# Patient Record
Sex: MALE | Race: WHITE | NOT HISPANIC OR LATINO | Employment: OTHER | URBAN - METROPOLITAN AREA
[De-identification: names, ages, dates, MRNs, and addresses within clinical notes are randomized per-mention and may not be internally consistent; named-entity substitution may affect disease eponyms.]

---

## 2018-05-03 ENCOUNTER — OFFICE VISIT (OUTPATIENT)
Dept: FAMILY MEDICINE CLINIC | Facility: CLINIC | Age: 78
End: 2018-05-03
Payer: COMMERCIAL

## 2018-05-03 VITALS
HEIGHT: 75 IN | RESPIRATION RATE: 16 BRPM | TEMPERATURE: 97.2 F | OXYGEN SATURATION: 98 % | DIASTOLIC BLOOD PRESSURE: 90 MMHG | BODY MASS INDEX: 23.75 KG/M2 | WEIGHT: 191 LBS | HEART RATE: 83 BPM | SYSTOLIC BLOOD PRESSURE: 150 MMHG

## 2018-05-03 DIAGNOSIS — Z13.220 LIPID SCREENING: ICD-10-CM

## 2018-05-03 DIAGNOSIS — I10 ESSENTIAL HYPERTENSION: ICD-10-CM

## 2018-05-03 DIAGNOSIS — J30.1 SEASONAL ALLERGIC RHINITIS DUE TO POLLEN: ICD-10-CM

## 2018-05-03 DIAGNOSIS — Z76.89 ENCOUNTER TO ESTABLISH CARE: Primary | ICD-10-CM

## 2018-05-03 DIAGNOSIS — E78.2 HYPERLIPIDEMIA, MIXED: ICD-10-CM

## 2018-05-03 PROCEDURE — 99204 OFFICE O/P NEW MOD 45 MIN: CPT | Performed by: NURSE PRACTITIONER

## 2018-05-03 PROCEDURE — 1101F PT FALLS ASSESS-DOCD LE1/YR: CPT | Performed by: NURSE PRACTITIONER

## 2018-05-03 PROCEDURE — 3725F SCREEN DEPRESSION PERFORMED: CPT | Performed by: NURSE PRACTITIONER

## 2018-05-03 RX ORDER — CETIRIZINE HYDROCHLORIDE 10 MG/1
10 TABLET ORAL DAILY
Qty: 30 TABLET | Refills: 3
Start: 2018-05-03 | End: 2019-11-29

## 2018-05-03 RX ORDER — AZELASTINE HYDROCHLORIDE, FLUTICASONE PROPIONATE 137; 50 UG/1; UG/1
2 SPRAY, METERED NASAL DAILY
Qty: 1 BOTTLE | Refills: 0 | Status: SHIPPED | OUTPATIENT
Start: 2018-05-03 | End: 2019-11-29

## 2018-05-03 RX ORDER — LISINOPRIL 20 MG/1
20 TABLET ORAL DAILY
Qty: 90 TABLET | Refills: 1 | Status: SHIPPED | OUTPATIENT
Start: 2018-05-03 | End: 2019-11-29

## 2018-05-03 RX ORDER — LISINOPRIL 20 MG/1
20 TABLET ORAL DAILY
Refills: 3 | COMMUNITY
Start: 2018-02-08 | End: 2018-05-03 | Stop reason: SDUPTHER

## 2018-05-03 RX ORDER — AZELASTINE HYDROCHLORIDE, FLUTICASONE PROPIONATE 137; 50 UG/1; UG/1
2 SPRAY, METERED NASAL DAILY
Qty: 1 BOTTLE | Refills: 3 | Status: SHIPPED | OUTPATIENT
Start: 2018-05-03 | End: 2018-05-03 | Stop reason: SDUPTHER

## 2018-05-03 RX ORDER — ALPRAZOLAM 0.5 MG/1
0.5 TABLET ORAL 2 TIMES DAILY PRN
Refills: 2 | COMMUNITY
Start: 2018-02-08 | End: 2019-11-29

## 2018-05-03 NOTE — PROGRESS NOTES
Assessment/Plan:    No problem-specific Assessment & Plan notes found for this encounter  Diagnoses and all orders for this visit:    Encounter to establish care    Essential hypertension  -     lisinopril (ZESTRIL) 20 mg tablet; Take 1 tablet (20 mg total) by mouth daily for 90 days  -     Comprehensive metabolic panel; Future  -     CBC and differential; Future    Seasonal allergic rhinitis due to pollen  -     Discontinue: Azelastine-Fluticasone 137-50 MCG/ACT SUSP; 2 sprays into each nostril daily for 30 days  -     cetirizine (ZyrTEC) 10 mg tablet; Take 1 tablet (10 mg total) by mouth daily for 30 days  -     Azelastine-Fluticasone 137-50 MCG/ACT SUSP; 2 sprays into each nostril daily for 30 days  -     Comprehensive metabolic panel; Future  -     CBC and differential; Future    Hyperlipidemia, mixed    Lipid screening  -     Lipid panel; Future    Other orders  -     ALPRAZolam (XANAX) 0 5 mg tablet; Take 0 5 mg by mouth 2 (two) times a day as needed for anxiety  -     Discontinue: lisinopril (ZESTRIL) 20 mg tablet; Take 20 mg by mouth daily          Subjective:      Patient ID: Josefina Dejesus is a 78 y o  male     htn- not at goal  Pt has long h/o sinus problems  He feels very congested on the left nostril   He feels post nasal drip and his ears feel full  HE has been taking Afrin every day for the past 2-3 months  The following portions of the patient's history were reviewed and updated as appropriate:   He  has a past medical history of Hypertension  He   Patient Active Problem List    Diagnosis Date Noted    Encounter to establish care 05/03/2018    Essential hypertension 05/03/2018    Seasonal allergic rhinitis due to pollen 05/03/2018    Hyperlipidemia, mixed 05/03/2018     He  has a past surgical history that includes Fracture surgery and Hernia repair  His family history is not on file  He  reports that he has never smoked   He has never used smokeless tobacco  He reports that he drinks about 9 0 oz of alcohol per week   He reports that he does not use drugs  Current Outpatient Prescriptions   Medication Sig Dispense Refill    ALPRAZolam (XANAX) 0 5 mg tablet Take 0 5 mg by mouth 2 (two) times a day as needed for anxiety  2    Azelastine-Fluticasone 137-50 MCG/ACT SUSP 2 sprays into each nostril daily for 30 days 1 Bottle 0    cetirizine (ZyrTEC) 10 mg tablet Take 1 tablet (10 mg total) by mouth daily for 30 days 30 tablet 3    lisinopril (ZESTRIL) 20 mg tablet Take 1 tablet (20 mg total) by mouth daily for 90 days 90 tablet 1     No current facility-administered medications for this visit  Current Outpatient Prescriptions on File Prior to Visit   Medication Sig    [DISCONTINUED] fluticasone (FLONASE) 50 mcg/act nasal spray 2 sprays into each nostril daily for 7 days    [DISCONTINUED] lisinopril (ZESTRIL) 10 mg tablet Take 10 mg by mouth daily    [DISCONTINUED] loratadine (CLARITIN) 10 mg tablet Take 1 tablet by mouth daily for 10 days     No current facility-administered medications on file prior to visit  He has No Known Allergies       Review of Systems   Constitutional: Negative for fatigue and fever  HENT: Positive for congestion and postnasal drip  Eyes: Negative for visual disturbance  Respiratory: Positive for cough  Negative for shortness of breath  Cardiovascular: Negative for chest pain, palpitations and leg swelling  Gastrointestinal: Negative for abdominal distention and abdominal pain  Endocrine: Negative for cold intolerance, polydipsia and polyuria  Genitourinary: Negative for difficulty urinating  Musculoskeletal: Negative for back pain and joint swelling  Skin: Negative for color change and rash  Allergic/Immunologic: Negative for immunocompromised state  Neurological: Negative for dizziness and headaches  Hematological: Negative for adenopathy  Psychiatric/Behavioral: Negative for behavioral problems and sleep disturbance     All other systems reviewed and are negative  Objective:      /90 (BP Location: Left arm, Patient Position: Sitting)   Pulse 83   Temp (!) 97 2 °F (36 2 °C)   Resp 16   Ht 6' 2 5" (1 892 m)   Wt 86 6 kg (191 lb)   SpO2 98%   BMI 24 19 kg/m²          Physical Exam   Constitutional: He is oriented to person, place, and time  He appears well-developed and well-nourished  HENT:   Head: Normocephalic and atraumatic  Right Ear: External ear normal    Left Ear: External ear normal    Mouth/Throat: Oropharynx is clear and moist    Swollen and red nasal turbinates  Clear rhinorrhea  Eyes: Conjunctivae and EOM are normal  Pupils are equal, round, and reactive to light  Neck: Normal range of motion  Cardiovascular: Normal rate, regular rhythm and normal heart sounds  Exam reveals no gallop and no friction rub  No murmur heard  Pulmonary/Chest: Effort normal and breath sounds normal  No respiratory distress  Abdominal: Soft  There is no tenderness  Musculoskeletal: Normal range of motion  Lymphadenopathy:     He has no cervical adenopathy  Neurological: He is alert and oriented to person, place, and time  Skin: Skin is warm and dry  Psychiatric: He has a normal mood and affect  His behavior is normal  Judgment and thought content normal    Nursing note and vitals reviewed

## 2018-05-03 NOTE — PATIENT INSTRUCTIONS
htn-limit salt in your diet  Daily exercise is encouraged  Do NOT take a decongestant  STOP Afrin nasal spray  Allergic rhinitis-STOP afrin nasal spray  Only take prescription nasl spray  Start daily allergy medicine such as Claritin OR Zyrtec  Anxiety- continue alprazolam as needed  Please obtain labs before your next follow up    DASH Eating Plan   AMBULATORY CARE:   The DASH (Dietary Approaches to Stop Hypertension) Eating Plan  is designed to help prevent or lower high blood pressure  It can also help to lower LDL (bad) cholesterol and decrease your risk for heart disease  The plan is low in sodium, sugar, unhealthy fats, and total fat  It is high in potassium, calcium, magnesium, and fiber  These nutrients are added when you eat more fruits, vegetables, and whole grains  Your sodium limit each day: Your dietitian will tell you how much sodium is safe for you to have each day  People with high blood pressure should have no more than 1,500 to 2,300 mg of sodium in a day  A teaspoon (tsp) of salt has 2,300 mg of sodium  This may seem like a difficult goal, but small changes to the foods you eat can make a big difference  Your healthcare provider or dietitian can help you create a meal plan that follows your sodium limit  How to limit sodium:   · Read food labels  Food labels can help you choose foods that are low in sodium  The amount of sodium is listed in milligrams (mg)  The % Daily Value (DV) column tells you how much of your daily needs are met by 1 serving of the food for each nutrient listed  Choose foods that have less than 5% of the DV of sodium  These foods are considered low in sodium  Foods that have 20% or more of the DV of sodium are considered high in sodium  Avoid foods that have more than 300 mg of sodium in each serving  Choose foods that say low-sodium, reduced-sodium, or no salt added on the food label  · Avoid salt    Do not salt food at the table, and add very little salt to foods during cooking  Use herbs and spices, such as onions, garlic, and salt-free seasonings to add flavor to foods  Try lemon or lime juice or vinegar to give foods a tart flavor  Use hot peppers or a small amount of hot pepper sauce to add a spicy flavor to foods  · Ask about salt substitutes  Ask your healthcare provider if you may use salt substitutes  Some salt substitutes have ingredients that can be harmful if you have certain health conditions  · Choose foods carefully at restaurants  Meals from restaurants, especially fast food restaurants, are often high in sodium  Some restaurants have nutrition information that tells you the amount of sodium in their foods  Ask to have your food prepared with less, or no salt  What you need to know about fats:   · Include healthy fats  Examples are unsaturated fats and omega-3 fatty acids  Unsaturated fats are found in soybean, canola, olive, or sunflower oil, and liquid and soft tub margarines  Omega-3 fatty acids are found in fatty fish, such as salmon, tuna, mackerel, and sardines  It is also found in flaxseed oil and ground flaxseed  · Avoid unhealthy fats  Do not eat unhealthy fats, such as saturated fats and trans fats  Saturated fats are found in foods that contain fat from animals  Examples are fatty meats, whole milk, butter, cream, and other dairy foods  It is also found in shortening, stick margarine, palm oil, and coconut oil  Trans fats are found in fried foods, crackers, chips, and baked goods made with margarine or shortening  Foods to include: With the DASH eating plan, you need to eat a certain number of servings from each food group  This will help you get enough of certain nutrients and limit others  The amount of servings you should eat depends on how many calories you need  Your dietitian can tell you how many calories you need   The number of servings listed next to the food groups below are for people who need about 2,000 calories each day    · Grains:  6 to 8 servings (3 of these servings should be whole-grain foods)    ¨ 1 slice of whole-grain bread     ¨ 1 ounce of dry cereal    ¨ ½ cup of cooked cereal, pasta, or brown rice    · Vegetables and fruits:  4 to 5 servings of fruits and 4 to 5 servings of vegetables    ¨ 1 medium fruit    ¨ ½ cup of frozen, canned (no added salt), or chopped fresh vegetables     ¨ ½ cup of fresh, frozen, dried, or canned fruit (canned in light syrup or fruit juice)    ¨ ½ cup of vegetable or fruit juice    · Dairy:  2 to 3 servings    ¨ 1 cup of nonfat (skim) or 1% milk    ¨ 1½ ounces of fat-free or low-fat cheese    ¨ 6 ounces of nonfat or low-fat yogurt    · Lean meat, poultry, and fish:  6 ounces or less    [employer] (chicken, turkey) with no skin    ¨ Fish (especially fatty fish, such as salmon, fresh tuna, or mackerel)    ¨ Lean beef and pork (loin, round, extra lean hamburger)    ¨ Egg whites and egg substitutes    · Nuts, seeds, and legumes:  4 to 5 servings each week    ¨ ½ cup of cooked beans and peas    ¨ 1½ ounces of unsalted nuts    ¨ 2 tablespoons of peanut butter or seeds    · Sweets and added sugars:  5 or less each week    ¨ 1 tablespoon of sugar, jelly, or jam    ¨ ½ cup of sorbet or gelatin    ¨ 1 cup of lemonade    · Fats:  2 to 3 servings each week    ¨ 1 teaspoon of soft margarine or vegetable oil    ¨ 1 tablespoon of mayonnaise    ¨ 2 tablespoons of salad dressing  Foods to avoid:   · Grains:      [employer], such as doughnuts, pastries, cookies, and biscuits (high in fat and sugar)    ¨ Mixes for cornbread and biscuits, packaged foods, such as bread stuffing, rice and pasta mixes, macaroni and cheese, and instant cereals (high in sodium)    · Fruits and vegetables:      ¨ Regular, canned vegetables (high in sodium)    ¨ Sauerkraut, pickled vegetables, and other foods prepared in brine (high in sodium)    ¨ Fried vegetables or vegetables in butter or high-fat sauces    ¨ Fruit in cream or butter sauce (high in fat)    · Dairy:      ¨ Whole milk, 2% milk, and cream (high in fat)    ¨ Regular cheese and processed cheese (high in fat and sodium)    · Meats and protein foods:      ¨ Smoked or cured meat, such as corned beef, guaman, ham, hot dogs, and sausage (high in fat and sodium)    ¨ Canned beans and canned meats or spreads, such as potted meats, sardines, anchovies, and imitation seafood (high in sodium)    ¨ Deli or lunch meats, such as bologna, ham, turkey, and roast beef (high in sodium)    ¨ High-fat meat (T-bone steak, regular hamburger, and ribs)    ¨ Whole eggs and egg yolks (high in fat)    · Other:      ¨ Seasonings made with salt, such as garlic salt, celery salt, onion salt, seasoned salt, meat tenderizers, and monosodium glutamate (MSG)    ¨ Miso soup and canned or dried soup mixes (high in sodium)    ¨ Regular soy sauce, barbecue sauce, teriyaki sauce, steak sauce, Worcestershire sauce, and most flavored vinegars (high in sodium)    ¨ Regular condiments, such as mustard, ketchup, and salad dressings (high in sodium)    ¨ Gravy and sauces, such as Diaz or cheese sauces (high in sodium and fat)    ¨ Drinks high in sugar, such as soda or fruit drinks    [employer] foods, such as salted chips, popcorn, pretzels, pork rinds, salted crackers, and salted nuts    ¨ Frozen foods, such as dinners, entrees, vegetables with sauces, and breaded meats (high in sodium)  Other guidelines to follow:   · Maintain a healthy weight  Your risk for heart disease is higher if you are overweight  Your healthcare provider may suggest that you lose weight if you are overweight  You can lose weight by eating fewer calories and foods that have added sugars and fat  The DASH meal plan can help you do this  Decrease calories by eating smaller portions at each meal and fewer snacks  Ask your healthcare provider for more information about how to lose weight  · Exercise regularly    Regular exercise can help you reach or maintain a healthy weight  Regular exercise can also help decrease your blood pressure and improve your cholesterol levels  Get 30 minutes or more of moderate exercise each day of the week  To lose weight, get at least 60 minutes of exercise  Talk to your healthcare provider about the best exercise program for you  · Limit alcohol  Women should limit alcohol to 1 drink a day  Men should limit alcohol to 2 drinks a day  A drink of alcohol is 12 ounces of beer, 5 ounces of wine, or 1½ ounces of liquor  © 2017 [facility] Information is for End User's use only and may not be sold, redistributed or otherwise used for commercial purposes  All illustrations and images included in CareNotes® are the copyrighted property of [employer]  or Babatunde Brennan  The above information is an  only  It is not intended as medical advice for individual conditions or treatments  Talk to your doctor, nurse or pharmacist before following any medical regimen to see if it is safe and effective for you

## 2018-05-19 ENCOUNTER — LAB (OUTPATIENT)
Dept: LAB | Facility: CLINIC | Age: 78
End: 2018-05-19
Payer: COMMERCIAL

## 2018-05-19 DIAGNOSIS — J30.1 SEASONAL ALLERGIC RHINITIS DUE TO POLLEN: ICD-10-CM

## 2018-05-19 DIAGNOSIS — Z13.220 LIPID SCREENING: ICD-10-CM

## 2018-05-19 DIAGNOSIS — I10 ESSENTIAL HYPERTENSION: ICD-10-CM

## 2018-05-19 LAB
ALBUMIN SERPL BCP-MCNC: 3.6 G/DL (ref 3.5–5)
ALP SERPL-CCNC: 60 U/L (ref 46–116)
ALT SERPL W P-5'-P-CCNC: 21 U/L (ref 12–78)
ANION GAP SERPL CALCULATED.3IONS-SCNC: 5 MMOL/L (ref 4–13)
AST SERPL W P-5'-P-CCNC: 19 U/L (ref 5–45)
BASOPHILS # BLD AUTO: 0.02 THOUSANDS/ΜL (ref 0–0.1)
BASOPHILS NFR BLD AUTO: 0 % (ref 0–1)
BILIRUB SERPL-MCNC: 0.78 MG/DL (ref 0.2–1)
BUN SERPL-MCNC: 13 MG/DL (ref 5–25)
CALCIUM SERPL-MCNC: 8.8 MG/DL (ref 8.3–10.1)
CHLORIDE SERPL-SCNC: 110 MMOL/L (ref 100–108)
CHOLEST SERPL-MCNC: 152 MG/DL (ref 50–200)
CO2 SERPL-SCNC: 27 MMOL/L (ref 21–32)
CREAT SERPL-MCNC: 0.96 MG/DL (ref 0.6–1.3)
EOSINOPHIL # BLD AUTO: 0.1 THOUSAND/ΜL (ref 0–0.61)
EOSINOPHIL NFR BLD AUTO: 2 % (ref 0–6)
ERYTHROCYTE [DISTWIDTH] IN BLOOD BY AUTOMATED COUNT: 13.1 % (ref 11.6–15.1)
GFR SERPL CREATININE-BSD FRML MDRD: 77 ML/MIN/1.73SQ M
GLUCOSE P FAST SERPL-MCNC: 95 MG/DL (ref 65–99)
HCT VFR BLD AUTO: 44 % (ref 36.5–49.3)
HDLC SERPL-MCNC: 63 MG/DL (ref 40–60)
HGB BLD-MCNC: 14.6 G/DL (ref 12–17)
LDLC SERPL CALC-MCNC: 78 MG/DL (ref 0–100)
LYMPHOCYTES # BLD AUTO: 1.59 THOUSANDS/ΜL (ref 0.6–4.47)
LYMPHOCYTES NFR BLD AUTO: 32 % (ref 14–44)
MCH RBC QN AUTO: 31.3 PG (ref 26.8–34.3)
MCHC RBC AUTO-ENTMCNC: 33.2 G/DL (ref 31.4–37.4)
MCV RBC AUTO: 94 FL (ref 82–98)
MONOCYTES # BLD AUTO: 0.38 THOUSAND/ΜL (ref 0.17–1.22)
MONOCYTES NFR BLD AUTO: 8 % (ref 4–12)
NEUTROPHILS # BLD AUTO: 2.92 THOUSANDS/ΜL (ref 1.85–7.62)
NEUTS SEG NFR BLD AUTO: 58 % (ref 43–75)
NONHDLC SERPL-MCNC: 89 MG/DL
NRBC BLD AUTO-RTO: 0 /100 WBCS
PLATELET # BLD AUTO: 168 THOUSANDS/UL (ref 149–390)
PMV BLD AUTO: 11.2 FL (ref 8.9–12.7)
POTASSIUM SERPL-SCNC: 4.5 MMOL/L (ref 3.5–5.3)
PROT SERPL-MCNC: 6.5 G/DL (ref 6.4–8.2)
RBC # BLD AUTO: 4.67 MILLION/UL (ref 3.88–5.62)
SODIUM SERPL-SCNC: 142 MMOL/L (ref 136–145)
TRIGL SERPL-MCNC: 55 MG/DL
WBC # BLD AUTO: 5.02 THOUSAND/UL (ref 4.31–10.16)

## 2018-05-19 PROCEDURE — 80061 LIPID PANEL: CPT

## 2018-05-19 PROCEDURE — 80053 COMPREHEN METABOLIC PANEL: CPT

## 2018-05-19 PROCEDURE — 85025 COMPLETE CBC W/AUTO DIFF WBC: CPT

## 2018-05-19 PROCEDURE — 36415 COLL VENOUS BLD VENIPUNCTURE: CPT

## 2018-06-04 ENCOUNTER — OFFICE VISIT (OUTPATIENT)
Dept: FAMILY MEDICINE CLINIC | Facility: CLINIC | Age: 78
End: 2018-06-04
Payer: COMMERCIAL

## 2018-06-04 VITALS
DIASTOLIC BLOOD PRESSURE: 88 MMHG | BODY MASS INDEX: 23.38 KG/M2 | HEIGHT: 75 IN | HEART RATE: 78 BPM | RESPIRATION RATE: 16 BRPM | WEIGHT: 188 LBS | TEMPERATURE: 97 F | SYSTOLIC BLOOD PRESSURE: 152 MMHG | OXYGEN SATURATION: 98 %

## 2018-06-04 DIAGNOSIS — J01.20 ACUTE NON-RECURRENT ETHMOIDAL SINUSITIS: Primary | ICD-10-CM

## 2018-06-04 DIAGNOSIS — I10 ESSENTIAL HYPERTENSION: ICD-10-CM

## 2018-06-04 PROCEDURE — 99214 OFFICE O/P EST MOD 30 MIN: CPT | Performed by: NURSE PRACTITIONER

## 2018-06-04 PROCEDURE — 1160F RVW MEDS BY RX/DR IN RCRD: CPT | Performed by: NURSE PRACTITIONER

## 2018-06-04 PROCEDURE — 3008F BODY MASS INDEX DOCD: CPT | Performed by: NURSE PRACTITIONER

## 2018-06-04 PROCEDURE — 1036F TOBACCO NON-USER: CPT | Performed by: NURSE PRACTITIONER

## 2018-06-04 RX ORDER — AZITHROMYCIN 250 MG/1
TABLET, FILM COATED ORAL
Qty: 6 TABLET | Refills: 0 | Status: SHIPPED | OUTPATIENT
Start: 2018-06-04 | End: 2018-06-08

## 2018-06-04 RX ORDER — FLUTICASONE PROPIONATE 50 MCG
2 SPRAY, SUSPENSION (ML) NASAL DAILY
Refills: 2 | COMMUNITY
Start: 2018-06-02 | End: 2019-11-29

## 2018-06-04 NOTE — PATIENT INSTRUCTIONS
Sinusitis- start antibiotics as ordered  Take nasal spray and daily zyrtec as you just started  Hypertension- limit alcohol  Take BP meds daily as ordered

## 2018-06-04 NOTE — PROGRESS NOTES
Assessment/Plan:    No problem-specific Assessment & Plan notes found for this encounter  Diagnoses and all orders for this visit:    Acute non-recurrent ethmoidal sinusitis  -     azithromycin (ZITHROMAX) 250 mg tablet; Take 2 tablets today then 1 tablet daily x 4 days    Essential hypertension    Other orders  -     fluticasone (FLONASE) 50 mcg/act nasal spray; 2 sprays daily          Subjective:      Patient ID: Dhaval Khan is a 78 y o  male  Pt is here with sinus symptoms since last week  Last week he felt fatigued and had sinus congestion  Appetite is diminished  Today, he resumed Zyrtec and OTC nasal spray  He felt a little better since he resumed meds  He was aching today and started Ibuprofen    htn- dbp is elevated  He doesn't take his meds every day        The following portions of the patient's history were reviewed and updated as appropriate:   He  has a past medical history of Hypertension  He   Patient Active Problem List    Diagnosis Date Noted    Acute non-recurrent ethmoidal sinusitis 06/04/2018    Encounter to [facility] care 05/03/2018    Essential hypertension 05/03/2018    Seasonal allergic rhinitis due to pollen 05/03/2018    Hyperlipidemia, mixed 05/03/2018     He  has a past surgical history that includes Fracture surgery and Hernia repair  His family history is not on file  He  reports that he has never smoked  He has never used smokeless tobacco  He reports that he drinks about 9 0 oz of alcohol per week   He reports that he does not use drugs    Current Outpatient Prescriptions   Medication Sig Dispense Refill    ALPRAZolam (XANAX) 0 5 mg tablet Take 0 5 mg by mouth 2 (two) times a day as needed for anxiety  2    Azelastine-Fluticasone 137-50 MCG/ACT SUSP 2 sprays into each nostril daily for 30 days 1 Bottle 0    azithromycin (ZITHROMAX) 250 mg tablet Take 2 tablets today then 1 tablet daily x 4 days 6 tablet 0    cetirizine (ZyrTEC) 10 mg tablet Take 1 tablet (10 mg total) by mouth daily for 30 days 30 tablet 3    fluticasone (FLONASE) 50 mcg/act nasal spray 2 sprays daily  2    lisinopril (ZESTRIL) 20 mg tablet Take 1 tablet (20 mg total) by mouth daily for 90 days 90 tablet 1     No current facility-administered medications for this visit  Current Outpatient Prescriptions on File Prior to Visit   Medication Sig    ALPRAZolam (XANAX) 0 5 mg tablet Take 0 5 mg by mouth 2 (two) times a day as needed for anxiety    Azelastine-Fluticasone 137-50 MCG/ACT SUSP 2 sprays into each nostril daily for 30 days    cetirizine (ZyrTEC) 10 mg tablet Take 1 tablet (10 mg total) by mouth daily for 30 days    lisinopril (ZESTRIL) 20 mg tablet Take 1 tablet (20 mg total) by mouth daily for 90 days     No current facility-administered medications on file prior to visit  He has No Known Allergies       Review of Systems   Constitutional: Positive for fatigue and fever  HENT: Positive for congestion and rhinorrhea  Negative for postnasal drip, sinus pain, sinus pressure and sore throat  Eyes: Negative for discharge and redness  Respiratory: Negative for cough, shortness of breath and wheezing  Cardiovascular: Negative for chest pain  Gastrointestinal: Negative for abdominal pain  Skin: Negative for color change and rash  Allergic/Immunologic: Negative for immunocompromised state  Neurological: Positive for headaches  Hematological: Negative for adenopathy  All other systems reviewed and are negative  Objective:      /88 (BP Location: Right arm, Patient Position: Sitting)   Pulse 78   Temp (!) 97 °F (36 1 °C)   Resp 16   Ht 6' 2 5" (1 892 m)   Wt 85 3 kg (188 lb)   SpO2 98%   BMI 23 81 kg/m²          Physical Exam   Constitutional: He is oriented to person, place, and time  He appears well-developed and well-nourished  No distress  HENT:   Head: Normocephalic and atraumatic     Right Ear: External ear normal    Left Ear: External ear normal  Nose: Nose normal    Mouth/Throat: Oropharynx is clear and moist  No oropharyngeal exudate  Moderate nasal sinus congestion, mucoid nasal d/s    Eyes: Conjunctivae and EOM are normal  Pupils are equal, round, and reactive to light  Right eye exhibits no discharge  Left eye exhibits no discharge  Neck: Normal range of motion  Neck supple  Cardiovascular: Normal rate, regular rhythm and normal heart sounds  Exam reveals no gallop and no friction rub  No murmur heard  Pulmonary/Chest: Effort normal and breath sounds normal  No respiratory distress  He has no wheezes  Abdominal: Soft  Musculoskeletal: Normal range of motion  He exhibits no edema  Lymphadenopathy:     He has no cervical adenopathy  Neurological: He is alert and oriented to person, place, and time  Skin: Skin is warm and dry  No rash noted  He is not diaphoretic  No erythema  Psychiatric: He has a normal mood and affect  His behavior is normal  Judgment and thought content normal    Nursing note and vitals reviewed

## 2019-11-28 ENCOUNTER — HOSPITAL ENCOUNTER (EMERGENCY)
Facility: HOSPITAL | Age: 79
Discharge: HOME | End: 2019-11-28
Attending: EMERGENCY MEDICINE
Payer: COMMERCIAL

## 2019-11-28 ENCOUNTER — APPOINTMENT (EMERGENCY)
Dept: CT IMAGING | Facility: HOSPITAL | Age: 79
End: 2019-11-28
Payer: COMMERCIAL

## 2019-11-28 VITALS
SYSTOLIC BLOOD PRESSURE: 160 MMHG | DIASTOLIC BLOOD PRESSURE: 92 MMHG | OXYGEN SATURATION: 97 % | HEART RATE: 80 BPM | RESPIRATION RATE: 18 BRPM | WEIGHT: 182.54 LBS | BODY MASS INDEX: 23.12 KG/M2 | TEMPERATURE: 98.9 F

## 2019-11-28 DIAGNOSIS — R33.9 URINARY RETENTION: Primary | ICD-10-CM

## 2019-11-28 LAB
ALBUMIN SERPL BCP-MCNC: 3.3 G/DL (ref 3.5–5)
ALP SERPL-CCNC: 81 U/L (ref 46–116)
ALT SERPL W P-5'-P-CCNC: 19 U/L (ref 12–78)
ANION GAP SERPL CALCULATED.3IONS-SCNC: 11 MMOL/L (ref 4–13)
APTT PPP: 34 SECONDS (ref 23–37)
AST SERPL W P-5'-P-CCNC: 17 U/L (ref 5–45)
BACTERIA UR QL AUTO: ABNORMAL /HPF
BASOPHILS # BLD AUTO: 0.03 THOUSANDS/ΜL (ref 0–0.1)
BASOPHILS NFR BLD AUTO: 0 % (ref 0–1)
BILIRUB SERPL-MCNC: 1 MG/DL (ref 0.2–1)
BILIRUB UR QL STRIP: NEGATIVE
BUN SERPL-MCNC: 15 MG/DL (ref 5–25)
CALCIUM SERPL-MCNC: 8.8 MG/DL (ref 8.3–10.1)
CHLORIDE SERPL-SCNC: 103 MMOL/L (ref 100–108)
CLARITY UR: CLEAR
CO2 SERPL-SCNC: 26 MMOL/L (ref 21–32)
COLOR UR: YELLOW
CREAT SERPL-MCNC: 1.12 MG/DL (ref 0.6–1.3)
EOSINOPHIL # BLD AUTO: 0.02 THOUSAND/ΜL (ref 0–0.61)
EOSINOPHIL NFR BLD AUTO: 0 % (ref 0–6)
ERYTHROCYTE [DISTWIDTH] IN BLOOD BY AUTOMATED COUNT: 12.3 % (ref 11.6–15.1)
GFR SERPL CREATININE-BSD FRML MDRD: 63 ML/MIN/1.73SQ M
GLUCOSE SERPL-MCNC: 111 MG/DL (ref 65–140)
GLUCOSE UR STRIP-MCNC: NEGATIVE MG/DL
HCT VFR BLD AUTO: 41.9 % (ref 36.5–49.3)
HGB BLD-MCNC: 14.1 G/DL (ref 12–17)
HGB UR QL STRIP.AUTO: NEGATIVE
IMM GRANULOCYTES # BLD AUTO: 0.1 THOUSAND/UL (ref 0–0.2)
IMM GRANULOCYTES NFR BLD AUTO: 1 % (ref 0–2)
INR PPP: 1.25 (ref 0.84–1.19)
KETONES UR STRIP-MCNC: ABNORMAL MG/DL
LACTATE SERPL-SCNC: 1 MMOL/L (ref 0.5–2)
LEUKOCYTE ESTERASE UR QL STRIP: ABNORMAL
LYMPHOCYTES # BLD AUTO: 0.85 THOUSANDS/ΜL (ref 0.6–4.47)
LYMPHOCYTES NFR BLD AUTO: 6 % (ref 14–44)
MCH RBC QN AUTO: 31.8 PG (ref 26.8–34.3)
MCHC RBC AUTO-ENTMCNC: 33.7 G/DL (ref 31.4–37.4)
MCV RBC AUTO: 94 FL (ref 82–98)
MONOCYTES # BLD AUTO: 1.06 THOUSAND/ΜL (ref 0.17–1.22)
MONOCYTES NFR BLD AUTO: 7 % (ref 4–12)
NEUTROPHILS # BLD AUTO: 12.26 THOUSANDS/ΜL (ref 1.85–7.62)
NEUTS SEG NFR BLD AUTO: 86 % (ref 43–75)
NITRITE UR QL STRIP: NEGATIVE
NON-SQ EPI CELLS URNS QL MICRO: ABNORMAL /HPF
NRBC BLD AUTO-RTO: 0 /100 WBCS
PH UR STRIP.AUTO: 6 [PH]
PLATELET # BLD AUTO: 184 THOUSANDS/UL (ref 149–390)
PMV BLD AUTO: 10.1 FL (ref 8.9–12.7)
POTASSIUM SERPL-SCNC: 3.7 MMOL/L (ref 3.5–5.3)
PROT SERPL-MCNC: 6.9 G/DL (ref 6.4–8.2)
PROT UR STRIP-MCNC: ABNORMAL MG/DL
PROTHROMBIN TIME: 15.7 SECONDS (ref 11.6–14.5)
RBC # BLD AUTO: 4.44 MILLION/UL (ref 3.88–5.62)
RBC #/AREA URNS AUTO: ABNORMAL /HPF
SODIUM SERPL-SCNC: 140 MMOL/L (ref 136–145)
SP GR UR STRIP.AUTO: 1.02 (ref 1–1.03)
UROBILINOGEN UR QL STRIP.AUTO: 0.2 E.U./DL
WBC # BLD AUTO: 14.32 THOUSAND/UL (ref 4.31–10.16)
WBC #/AREA URNS AUTO: ABNORMAL /HPF

## 2019-11-28 PROCEDURE — 36415 COLL VENOUS BLD VENIPUNCTURE: CPT | Performed by: PHYSICIAN ASSISTANT

## 2019-11-28 PROCEDURE — 85025 COMPLETE CBC W/AUTO DIFF WBC: CPT | Performed by: PHYSICIAN ASSISTANT

## 2019-11-28 PROCEDURE — 81001 URINALYSIS AUTO W/SCOPE: CPT | Performed by: PHYSICIAN ASSISTANT

## 2019-11-28 PROCEDURE — 85730 THROMBOPLASTIN TIME PARTIAL: CPT | Performed by: PHYSICIAN ASSISTANT

## 2019-11-28 PROCEDURE — 99285 EMERGENCY DEPT VISIT HI MDM: CPT | Performed by: PHYSICIAN ASSISTANT

## 2019-11-28 PROCEDURE — 87086 URINE CULTURE/COLONY COUNT: CPT | Performed by: PHYSICIAN ASSISTANT

## 2019-11-28 PROCEDURE — 87186 SC STD MICRODIL/AGAR DIL: CPT | Performed by: PHYSICIAN ASSISTANT

## 2019-11-28 PROCEDURE — 87077 CULTURE AEROBIC IDENTIFY: CPT | Performed by: PHYSICIAN ASSISTANT

## 2019-11-28 PROCEDURE — 99284 EMERGENCY DEPT VISIT MOD MDM: CPT

## 2019-11-28 PROCEDURE — 80053 COMPREHEN METABOLIC PANEL: CPT | Performed by: PHYSICIAN ASSISTANT

## 2019-11-28 PROCEDURE — 74176 CT ABD & PELVIS W/O CONTRAST: CPT

## 2019-11-28 PROCEDURE — 83605 ASSAY OF LACTIC ACID: CPT | Performed by: PHYSICIAN ASSISTANT

## 2019-11-28 PROCEDURE — 85610 PROTHROMBIN TIME: CPT | Performed by: PHYSICIAN ASSISTANT

## 2019-11-28 RX ORDER — TAMSULOSIN HYDROCHLORIDE 0.4 MG/1
0.4 CAPSULE ORAL
Qty: 10 CAPSULE | Refills: 0 | Status: SHIPPED | OUTPATIENT
Start: 2019-11-28 | End: 2019-12-05 | Stop reason: SDUPTHER

## 2019-11-28 NOTE — ED NOTES
Discharge instructions reviewed, patient has no new concerns or complaints at time of discharge  Patient ambulated out of department, steady gait, vss  Patient accompanied out of department by his friend        Liborio Kay RN  11/28/19 [id number]

## 2019-11-28 NOTE — ED PROVIDER NOTES
History  Chief Complaint   Patient presents with    Urinary Retention     c/o urinary retention, burning with urination and constipation   last urinated last night     70 y o  male with past medical history significant for hypertension presents to ED with chief complaint of urinary retention  Onset of symptoms reported as 1 day ago  Location of symptoms reported as Genitourinary tract  Quality is reported as urinary retention  Severity is reported as moderate  Associated symptoms: positive for abdominal pain, denies hematuria, denies fevers, denies vomiting,denies constipation  Denies back pain, denies lower extremity paralysis, paraesthesia or weakness  Modifying factors: no know aggravating or alleviating factors  Context: patient reports inability to pass urine this morning  No prior similar episodes in the past   Denies history of prostate problems  Denies any new medications  Reviewed past medical history and visits via EPIC:  No prior visits to this ed  History provided by:  Patient and significant other   used: No        Prior to Admission Medications   Prescriptions Last Dose Informant Patient Reported? Taking? ALPRAZolam (XANAX) 0 5 mg tablet   Yes No   Sig: Take 0 5 mg by mouth 2 (two) times a day as needed for anxiety   Azelastine-Fluticasone 137-50 MCG/ACT SUSP   No No   Si sprays into each nostril daily for 30 days   cetirizine (ZyrTEC) 10 mg tablet   No No   Sig: Take 1 tablet (10 mg total) by mouth daily for 30 days   fluticasone (FLONASE) 50 mcg/act nasal spray   Yes No   Si sprays daily   lisinopril (ZESTRIL) 20 mg tablet   No No   Sig: Take 1 tablet (20 mg total) by mouth daily for 90 days      Facility-Administered Medications: None       Past Medical History:   Diagnosis Date    Hypertension        Past Surgical History:   Procedure Laterality Date    FRACTURE SURGERY      HERNIA REPAIR         History reviewed  No pertinent family history    I have reviewed and agree with the history as documented  Social History     Tobacco Use    Smoking status: Never Smoker    Smokeless tobacco: Never Used   Substance Use Topics    Alcohol use: Yes     Alcohol/week: 15 0 standard drinks     Types: 15 Shots of liquor per week    Drug use: No        Review of Systems   Constitutional: Negative for activity change, appetite change, chills, diaphoresis, fatigue, fever and unexpected weight change  HENT: Negative for congestion, dental problem, drooling, ear discharge, ear pain, facial swelling, hearing loss, mouth sores, nosebleeds, postnasal drip, rhinorrhea, sinus pressure, sinus pain, sneezing, sore throat, tinnitus, trouble swallowing and voice change  Eyes: Negative for photophobia, pain, discharge, redness, itching and visual disturbance  Respiratory: Negative for apnea, cough, choking, chest tightness, shortness of breath, wheezing and stridor  Cardiovascular: Negative for chest pain, palpitations and leg swelling  Gastrointestinal: Positive for abdominal pain  Negative for abdominal distention, anal bleeding, blood in stool, constipation, diarrhea, nausea, rectal pain and vomiting  Endocrine: Negative for cold intolerance, heat intolerance, polydipsia, polyphagia and polyuria  Genitourinary: Positive for decreased urine volume and difficulty urinating  Negative for discharge, dysuria, flank pain, frequency, hematuria, penile pain, penile swelling, scrotal swelling, testicular pain and urgency  Musculoskeletal: Negative for arthralgias, back pain, gait problem, joint swelling, myalgias, neck pain and neck stiffness  Skin: Negative for color change, pallor, rash and wound  Allergic/Immunologic: Negative for environmental allergies, food allergies and immunocompromised state  Neurological: Negative for dizziness, tremors, seizures, syncope, facial asymmetry, speech difficulty, weakness, light-headedness, numbness and headaches  Hematological: Negative for adenopathy  Does not bruise/bleed easily  Psychiatric/Behavioral: Negative for agitation, confusion and hallucinations  The patient is not nervous/anxious  All other systems reviewed and are negative  Physical Exam  Physical Exam   Constitutional: He is oriented to person, place, and time  He appears well-developed and well-nourished  No distress  BP (!) 193/103 (BP Location: Right arm)   Pulse 91   Temp 98 9 °F (37 2 °C) (Oral)   Resp 19   Wt 82 8 kg (182 lb 8 7 oz)   SpO2 98%   BMI 23 12 kg/m²    HENT:   Head: Normocephalic and atraumatic  Right Ear: External ear normal    Left Ear: External ear normal    Nose: Nose normal    Mouth/Throat: Oropharynx is clear and moist  No oropharyngeal exudate  Eyes: Pupils are equal, round, and reactive to light  Conjunctivae and EOM are normal  Right eye exhibits no discharge  Left eye exhibits no discharge  No scleral icterus  Neck: Normal range of motion  Neck supple  No JVD present  No tracheal deviation present  No thyromegaly present  Cardiovascular: Normal rate, regular rhythm and intact distal pulses  Pulmonary/Chest: Effort normal and breath sounds normal  No stridor  No respiratory distress  He has no wheezes  He has no rales  He exhibits no tenderness  Abdominal: Soft  Bowel sounds are normal  He exhibits distension  He exhibits no mass  There is no tenderness  There is no rebound and no guarding  Musculoskeletal: Normal range of motion  He exhibits no edema, tenderness or deformity  Lymphadenopathy:     He has no cervical adenopathy  Neurological: He is alert and oriented to person, place, and time  He displays normal reflexes  No cranial nerve deficit or sensory deficit  He exhibits normal muscle tone  Coordination normal    Skin: Skin is warm and dry  Capillary refill takes less than 2 seconds  No rash noted  He is not diaphoretic  No erythema  No pallor     Psychiatric: He has a normal mood and affect  His behavior is normal  Judgment and thought content normal    Nursing note and vitals reviewed  Vital Signs  ED Triage Vitals [11/28/19 0727]   Temperature Pulse Respirations Blood Pressure SpO2   98 9 °F (37 2 °C) 91 19 (!) 193/103 98 %      Temp Source Heart Rate Source Patient Position - Orthostatic VS BP Location FiO2 (%)   Oral Monitor Sitting Right arm --      Pain Score       Worst Possible Pain           Vitals:    11/28/19 0727 11/28/19 0830   BP: (!) 193/103 160/92   Pulse: 91 80   Patient Position - Orthostatic VS: Sitting Lying         Visual Acuity      ED Medications  Medications - No data to display    Diagnostic Studies  Results Reviewed     Procedure Component Value Units Date/Time    Lactic acid, plasma [[id number]]  (Normal) Collected:  11/28/19 0801    Lab Status:  Final result Specimen:  Blood from Arm, Right Updated:  11/28/19 0834     LACTIC ACID 1 0 mmol/L     Narrative:       Result may be elevated if tourniquet was used during collection      Comprehensive metabolic panel [[id number]]  (Abnormal) Collected:  11/28/19 0801    Lab Status:  Final result Specimen:  Blood from Arm, Right Updated:  11/28/19 9575     Sodium 140 mmol/L      Potassium 3 7 mmol/L      Chloride 103 mmol/L      CO2 26 mmol/L      ANION GAP 11 mmol/L      BUN 15 mg/dL      Creatinine 1 12 mg/dL      Glucose 111 mg/dL      Calcium 8 8 mg/dL      AST 17 U/L      ALT 19 U/L      Alkaline Phosphatase 81 U/L      Total Protein 6 9 g/dL      Albumin 3 3 g/dL      Total Bilirubin 1 00 mg/dL      eGFR 63 ml/min/1 73sq m     Narrative:       [facility] guidelines for Chronic Kidney Disease (CKD):     Stage 1 with normal or high GFR (GFR > 90 mL/min/1 73 square meters)    Stage 2 Mild CKD (GFR = 60-89 mL/min/1 73 square meters)    Stage 3A Moderate CKD (GFR = 45-59 mL/min/1 73 square meters)    Stage 3B Moderate CKD (GFR = 30-44 mL/min/1 73 square meters)    Stage 4 Severe CKD (GFR = 15-29 mL/min/1 73 square meters)    Stage 5 End Stage CKD (GFR <15 mL/min/1 73 square meters)  Note: GFR calculation is accurate only with a steady state creatinine    Protime-INR [[id number]]  (Abnormal) Collected:  11/28/19 0801    Lab Status:  Final result Specimen:  Blood from Arm, Right Updated:  11/28/19 0830     Protime 15 7 seconds      INR 1 25    APTT [[id number]]  (Normal) Collected:  11/28/19 0801    Lab Status:  Final result Specimen:  Blood from Arm, Right Updated:  11/28/19 0830     PTT 34 seconds     Urine Microscopic [[id number]]  (Abnormal) Collected:  11/28/19 0804    Lab Status:  Final result Specimen:  Urine, Indwelling Sparrow Catheter Updated:  11/28/19 0821     RBC, UA None Seen /hpf      WBC, UA 10-20 /hpf      Epithelial Cells None Seen /hpf      Bacteria, UA Moderate /hpf     Urine culture [[id number]] Collected:  11/28/19 0804    Lab Status:   In process Specimen:  Urine, Indwelling Sparrow Catheter Updated:  11/28/19 0821    UA w Reflex to Microscopic w Reflex to Culture [[id number]]  (Abnormal) Collected:  11/28/19 0804    Lab Status:  Final result Specimen:  Urine, Indwelling Sparrow Catheter Updated:  11/28/19 0816     Color, UA Yellow     Clarity, UA Clear     Specific Gravity, UA 1 025     pH, UA 6 0     Leukocytes, UA Small     Nitrite, UA Negative     Protein, UA Trace mg/dl      Glucose, UA Negative mg/dl      Ketones, UA Trace mg/dl      Urobilinogen, UA 0 2 E U /dl      Bilirubin, UA Negative     Blood, UA Negative    CBC and differential [[id number]]  (Abnormal) Collected:  11/28/19 0801    Lab Status:  Final result Specimen:  Blood from Arm, Right Updated:  11/28/19 0813     WBC 14 32 Thousand/uL      RBC 4 44 Million/uL      Hemoglobin 14 1 g/dL      Hematocrit 41 9 %      MCV 94 fL      MCH 31 8 pg      MCHC 33 7 g/dL      RDW 12 3 %      MPV 10 1 fL      Platelets 268 Thousands/uL      nRBC 0 /100 WBCs      Neutrophils Relative 86 %      Immat GRANS % 1 %      Lymphocytes Relative 6 % Monocytes Relative 7 %      Eosinophils Relative 0 %      Basophils Relative 0 %      Neutrophils Absolute 12 26 Thousands/µL      Immature Grans Absolute 0 10 Thousand/uL      Lymphocytes Absolute 0 85 Thousands/µL      Monocytes Absolute 1 06 Thousand/µL      Eosinophils Absolute 0 02 Thousand/µL      Basophils Absolute 0 03 Thousands/µL                  CT renal stone study abdomen pelvis without contrast   Final Result by Nuria Salinas DO (11/28 4827)      No nephrolithiasis or hydronephrosis  There is a Sparrow catheter present within the bladder with no bladder calculi  Mild edema and stranding in the perirectal fat is nonspecific and may represent inflammation, i e  proctitis  Small hiatal hernia  Workstation performed: [id number]                    Procedures  Procedures       ED Course           Identification of Seniors at Risk      Most Recent Value   (ISAR) Identification of Seniors at Risk   Before the illness or injury that brought you to the Emergency, did you need someone to help you on a regular basis? 0 Filed at: 11/28/2019 0726   In the last 24 hours, have you needed more help than usual?  0 Filed at: 11/28/2019 0120   Have you been hospitalized for one or more nights during the past 6 months? 0 Filed at: 11/28/2019 0726   In general, do you see well?  0 Filed at: 11/28/2019 0726   In general, do you have serious problems with your memory? 0 Filed at: 11/28/2019 7173   Do you take more than three different medications every day?   1 Filed at: 11/28/2019 0726   ISAR Score  1 Filed at: 11/28/2019 2558                          MDM  Number of Diagnoses or Management Options  Urinary retention: new and requires workup  Diagnosis management comments: Differential diagnosis includes but is not limited to blood clots, ureteral calculi, bladder cancer, bladder/urethral foreign body, multiple sclerosis, brain tumor, stroke, constipation, cystitis, PID, medication use/side effects, pyelonephritis  Urinalysis results reviewed:  Negative for nitrites, negative for blood pr positive for small leukocytes  CBC demonstrates white blood cell count mildly elevated at 14 3  Hemoglobin of 14 1 and hematocrit of 41 9 are normal   No anemia  INR is mildly elevated at 1 2  CMP reviewed, bun 15, creatinine normal at 1 1  No renal failure  Lactic acid normal 1 0  CT scan of the abdomen pelvis images visualized by me  Radiology report reviewed:  FINDINGS:    RIGHT KIDNEY AND URETER:  No urinary tract calculi   No hydronephrosis or hydroureter  LEFT KIDNEY AND URETER:  No urinary tract calculi   No hydronephrosis or hydroureter  URINARY BLADDER:   There is a Sparrow catheter present within the bladder with no calculi  No significant abnormality in the visualized lung bases  Small hypodensity within the anterior segment of the right lobe of the liver immediately above the right portal vein may represent small cyst   Unremarkable spleen, pancreas and adrenal glands  No calcified gallstones or gallbladder wall thickening noted  No ascites or bulky lymphadenopathy on this limited noncontrast study  Limited noncontrast imaging of the bowel demonstrates mild fecal stasis with no signs of bowel obstruction   There is mild edema and stranding in the perirectal fat without a discrete fluid collection or mass, possibly related to proctitis  Meghan Nusrat is a   small hiatal hernia  Limited evaluation demonstrates no evidence to suggest acute appendicitis  Lumbar spondylitic degenerative change with no acute osseous abnormality  I reviewed all test results with the patient at bedside including findings on the CT scan  Discussed with patient diagnosis of urinary retention  His symptoms are improved after placement of a Sparrow catheter here in the emergency department    The patient did upon repeat evaluation reports that yesterday he took approximately 6 doses of cough cold medication for runny nose and allergies at home  I discussed with this may be the source of his symptoms  Discussed avoid these medications  Discussed call primary care physician and Neurology for follow-up and further evaluation of symptoms in the next 1-2 days  Discussed management of Sparrow catheters and outpatient  Discussed diagnosis of urinary retention  Discussed follow-up with primary care physician and Neurology for further evaluation of abnormal findings  Will start tamsulosin  Reviewed reasons to return to ed  Patient verbalized understanding of diagnosis and agreement with discharge plan of care as well as understanding of reasons to return to ed  I have reasonably determine that electronically prescribing a controlled substance would be impractical for the patient to obtain the controlled substance prescribed by electronic prescription or would cause an untimely delay resulting in an adverse impact on the patient's medical condition   Amount and/or Complexity of Data Reviewed  Clinical lab tests: ordered and reviewed  Tests in the radiology section of CPT®: ordered and reviewed  Discussion of test results with the performing providers: yes  Obtain history from someone other than the patient: yes (Family member)  Review and summarize past medical records: yes  Independent visualization of images, tracings, or specimens: yes    Patient Progress  Patient progress: stable      Disposition  Final diagnoses:   Urinary retention     Time reflects when diagnosis was documented in both MDM as applicable and the Disposition within this note     Time User Action Codes Description Comment    11/28/2019  9:31 AM Trudy Moreland Add [R33 9] Urinary retention       ED Disposition     ED Disposition Condition Date/Time Comment    Discharge Stable u Nov 28, 2019  9:31 AM Lynn Zavala discharge to home/self care              Follow-up Information     Follow up With Specialties Details Why Contact Info Additional [facility] Emergency Department Emergency Medicine Go to  If symptoms worsen [facility] [id number] [phone] [place] ED, [address] [facility], [place], [place], [address], [place] Urology Call in 1 day for further evaluation of symptoms Trevor Jauregui 70  [address]       Bennie Huerta MD Family Medicine Call in 1 day for further evaluation of symptoms [address] [facility],[address]  [facility] 16  [phone]             Discharge Medication List as of 11/28/2019  9:33 AM      START taking these medications    Details   tamsulosin (FLOMAX) 0 4 mg Take 1 capsule (0 4 mg total) by mouth daily with dinner for 10 days, Starting u 11/28/2019, Until Sun 12/8/2019, Print         CONTINUE these medications which have NOT CHANGED    Details   ALPRAZolam (XANAX) 0 5 mg tablet Take 0 5 mg by mouth 2 (two) times a day as needed for anxiety, Starting Thu 2/8/2018, Historical Med      Azelastine-Fluticasone 137-50 MCG/ACT SUSP 2 sprays into each nostril daily for 30 days, Starting Thu 5/3/2018, Until Sat 6/2/2018, Print      cetirizine (ZyrTEC) 10 mg tablet Take 1 tablet (10 mg total) by mouth daily for 30 days, Starting u 5/3/2018, Until Sat 6/2/2018, No Print      fluticasone (FLONASE) 50 mcg/act nasal spray 2 sprays daily, Starting Sat 6/2/2018, Historical Med      lisinopril (ZESTRIL) 20 mg tablet Take 1 tablet (20 mg total) by mouth daily for 90 days, Starting Thu 5/3/2018, Until Wed 8/1/2018, Normal           No discharge procedures on file      ED Provider  Electronically Signed by           Sean Giles PA-C  11/28/19 3340

## 2019-11-29 ENCOUNTER — HOSPITAL ENCOUNTER (EMERGENCY)
Facility: HOSPITAL | Age: 79
Discharge: HOME | End: 2019-11-29
Attending: EMERGENCY MEDICINE | Admitting: EMERGENCY MEDICINE
Payer: COMMERCIAL

## 2019-11-29 ENCOUNTER — TELEPHONE (OUTPATIENT)
Dept: UROLOGY | Facility: MEDICAL CENTER | Age: 79
End: 2019-11-29

## 2019-11-29 VITALS
SYSTOLIC BLOOD PRESSURE: 164 MMHG | HEIGHT: 74 IN | TEMPERATURE: 98.3 F | HEART RATE: 98 BPM | OXYGEN SATURATION: 98 % | BODY MASS INDEX: 24 KG/M2 | RESPIRATION RATE: 18 BRPM | WEIGHT: 187 LBS | DIASTOLIC BLOOD PRESSURE: 95 MMHG

## 2019-11-29 DIAGNOSIS — N39.0 UTI (URINARY TRACT INFECTION): Primary | ICD-10-CM

## 2019-11-29 DIAGNOSIS — R31.9 HEMATURIA: ICD-10-CM

## 2019-11-29 DIAGNOSIS — Z76.89 ENCOUNTER FOR EVALUATION OF FOLEY CATHETER: ICD-10-CM

## 2019-11-29 PROCEDURE — 99283 EMERGENCY DEPT VISIT LOW MDM: CPT

## 2019-11-29 PROCEDURE — 99284 EMERGENCY DEPT VISIT MOD MDM: CPT | Performed by: EMERGENCY MEDICINE

## 2019-11-29 RX ORDER — AMOXICILLIN AND CLAVULANATE POTASSIUM 875; 125 MG/1; MG/1
1 TABLET, FILM COATED ORAL EVERY 12 HOURS
Qty: 14 TABLET | Refills: 0 | Status: SHIPPED | OUTPATIENT
Start: 2019-11-29 | End: 2019-12-06

## 2019-11-29 RX ORDER — AMOXICILLIN AND CLAVULANATE POTASSIUM 875; 125 MG/1; MG/1
1 TABLET, FILM COATED ORAL ONCE
Status: COMPLETED | OUTPATIENT
Start: 2019-11-29 | End: 2019-11-29

## 2019-11-29 RX ADMIN — AMOXICILLIN AND CLAVULANATE POTASSIUM 1 TABLET: 875; 125 TABLET, FILM COATED ORAL at 14:12

## 2019-11-29 NOTE — DISCHARGE INSTRUCTIONS
Urinary Tract Infection in Men   WHAT YOU NEED TO KNOW:   A urinary tract infection (UTI) is caused by bacteria that get inside your urinary tract  Most bacteria that enter your urinary tract come out when you urinate  If the bacteria stay in your urinary tract, you may get an infection  Your urinary tract includes your kidneys, ureters, bladder, and urethra  Urine is made in your kidneys, and it flows from the ureters to the bladder  Urine leaves the bladder through the urethra  A UTI is more common in your lower urinary tract, which includes your bladder and urethra  DISCHARGE INSTRUCTIONS:   Seek care immediately if:   · You are urinating very little or not at all  · You have a high fever with shaking chills  · You have side or back pain that gets worse  Contact your healthcare provider if:   · You have a mild fever  · You do not feel better after 2 days of taking antibiotics  · You are vomiting  · You have new symptoms, such as blood or pus in your urine  · You have questions or concerns about your condition or care  Medicines:   · Antibiotics  help fight a bacterial infection  · Medicines  may be given to decrease pain and burning when you urinate  They will also help decrease the feeling that you need to urinate often  These medicines will make your urine orange or red  · Take your medicine as directed  Contact your healthcare provider if you think your medicine is not helping or if you have side effects  Tell him or her if you are allergic to any medicine  Keep a list of the medicines, vitamins, and herbs you take  Include the amounts, and when and why you take them  Bring the list or the pill bottles to follow-up visits  Carry your medicine list with you in case of an emergency  Prevent another UTI:   · Empty your bladder often  Urinate and empty your bladder as soon as you feel the need  Do not hold your urine for long periods of time      · Drink liquids as directed  Ask how much liquid to drink each day and which liquids are best for you  You may need to drink more liquids than usual to help flush out the bacteria  Do not drink alcohol, caffeine, or citrus juices  These can irritate your bladder and increase your symptoms  Your healthcare provider may recommend cranberry juice to help prevent a UTI  · Urinate after you have sex  This can help flush out bacteria passed during sex  · Do pelvic muscle exercises often  Pelvic muscle exercises may help you start and stop urinating  Strong pelvic muscles may help you empty your bladder easier  Squeeze these muscles tightly for 5 seconds like you are trying to hold back urine  Then relax for 5 seconds  Gradually work up to squeezing for 10 seconds  Do 3 sets of 15 repetitions a day, or as directed  Follow up with your healthcare provider as directed:  Write down your questions so you remember to ask them during your visits  © 2017 [address] [employer] Information is for End User's use only and may not be sold, redistributed or otherwise used for commercial purposes  All illustrations and images included in CareNotes® are the copyrighted property of "[employer]  or Babatunde Brennan  The above information is an  only  It is not intended as medical advice for individual conditions or treatments  Talk to your doctor, nurse or pharmacist before following any medical regimen to see if it is safe and effective for you  Urinary Retention in Men   WHAT YOU NEED TO KNOW:   Urinary retention is a condition that develops when your bladder does not empty completely when you urinate  DISCHARGE INSTRUCTIONS:   Medicines:   · Medicines  can help decrease the size of your prostate, fight infection, and help you urinate more easily  · Take your medicine as directed  Contact your healthcare provider if you think your medicine is not helping or if you have side effects   Tell him or her if you are allergic to any medicine  Keep a list of the medicines, vitamins, and herbs you take  Include the amounts, and when and why you take them  Bring the list or the pill bottles to follow-up visits  Carry your medicine list with you in case of an emergency  Sparrow catheter care: You may need a Sparrow catheter for up to 2 weeks at home  Healthcare providers will give you a smaller leg bag to collect urine  Keep the bag below your waist  This will prevent urine from flowing back into your bladder and causing an infection or other problems  Also, keep the tube free of kinks so the urine will drain properly  Do not pull on the catheter  This can cause pain and bleeding, and may cause the catheter to come out  Ask your healthcare provider or urologist for more information on Sparrow catheter care  Urinate regularly:  When your catheter is removed, do not let your bladder become too full before you urinate  Set regular times each day to urinate  Urinate as soon as you feel the need or at least every 3 hours while you are awake  Do not drink liquids before you go to bed  Urinate right before you go to bed  Follow up with your healthcare provider or urologist as directed:  Write down your questions so you remember to ask them during your visits  Contact your healthcare provider or urologist if:   · You have a fever  · You have pain when you urinate  · You have blood in your urine  · You have problems with your catheter  · You have questions or concerns about your condition or care  Return to the emergency department if:   · You have severe abdominal pain  · You are breathing faster than usual     · Your heartbeat is faster than usual     · Your face, hands, feet, or ankles are swollen  © 2017 [address] [employer] Information is for End User's use only and may not be sold, redistributed or otherwise used for commercial purposes   All illustrations and images included in CareNotes® are the copyrighted property of [employer]  or Babatunde Brennan  The above information is an  only  It is not intended as medical advice for individual conditions or treatments  Talk to your doctor, nurse or pharmacist before following any medical regimen to see if it is safe and effective for you

## 2019-11-29 NOTE — TELEPHONE ENCOUNTER
Please Triage - ER Follow Up  [place] [facility] - [place] Patient    What is the reason for the patients appointment? Patient seen at [facility] ER 11/28 for Retention  Catheter was inserted  Patient is experiencing pain and bleeding  Looking for same day appointment today  Do we accept the patient's insurance or is the [facility]  Member ID # [id number]       Has the patient had any previous urologist(s)? No     Has the patients records been requested? No     Has the patient had any outside testing done? No     What is the patients location preference for an office visit? [place] or [facility]     Does the patient have a personal history of cancer?   No - ok with seeing AP     Patients wife can be reached at [phone]

## 2019-11-29 NOTE — TELEPHONE ENCOUNTER
[phone] is the correct number the stated patient will go to [facility]  Stated that he is bleeding with bag and that it should be removed  Please advise if he can go to [facility]

## 2019-11-29 NOTE — ED PROVIDER NOTES
History  Chief Complaint   Patient presents with    Urinary Catheter Problem     pt reports urinary catheter bag inserted yesterday  pt reports urinary bag presents today with dark blood and penis irritation  Patient is a 57-year-old male with past medical history of hypertension, presents to the emergency department for discomfort related to a Sparrow catheter that was placed yesterday for acute urinary retention  Patient reports he came into the ED yesterday for acute urinary retention after he had not been able to urinate for almost an entire day  He had a workup in the ED significant for urinalysis which showed 10-20 WBCs, moderate bacteria and no epithelial cells  He had lab work done that was unremarkable other than a leukocytosis of 14,000  CT scan showed possible proctitis but no other acute abnormality  He was sent home with scripts for Flomax which has yet to be picked up from the pharmacy and a Sparrow catheter was placed with a leg bag to go home with  He states although he felt better when the catheter drained urine initially, last night he was up all night because it was uncomfortable and causing pain at the head of his penis  He also states that the urine started to become more bloody and darker in color  He is requesting that the catheter be removed  He reports poor appetite but thinks that is related to the penile discomfort  He denies any fever, shaking chills, headaches, dizziness or near syncope, cough, URI symptoms, chest pain, palpitations, dyspnea, abdominal pain, nausea, vomiting, diarrhea, constipation, blood per rectum or melena, recent dysuria or change in urinary frequency prior to the acute retention  He tried to make an appointment with Urology but all of the Urology office is were closed today        History provided by:  Patient and medical records   used: No    Urinary Catheter Problem   Associated symptoms: no abdominal pain, no chest pain, no congestion, no cough, no diarrhea, no ear pain, no fever, no headaches, no nausea, no rash, no rhinorrhea, no shortness of breath, no sore throat and no vomiting        Prior to Admission Medications   Prescriptions Last Dose Informant Patient Reported? Taking?   lisinopril (ZESTRIL) 20 mg tablet   No Yes   Sig: Take 1 tablet (20 mg total) by mouth daily for 90 days   tamsulosin (FLOMAX) 0 4 mg   No Yes   Sig: Take 1 capsule (0 4 mg total) by mouth daily with dinner for 10 days      Facility-Administered Medications: None       Past Medical History:   Diagnosis Date    Hypertension        Past Surgical History:   Procedure Laterality Date    FRACTURE SURGERY      HERNIA REPAIR         History reviewed  No pertinent family history  I have reviewed and agree with the history as documented  Social History     Tobacco Use    Smoking status: Never Smoker    Smokeless tobacco: Never Used   Substance Use Topics    Alcohol use: Yes     Alcohol/week: 15 0 standard drinks     Types: 15 Shots of liquor per week    Drug use: No        Review of Systems   Constitutional: Positive for appetite change  Negative for chills and fever  HENT: Negative for congestion, ear pain, rhinorrhea and sore throat  Respiratory: Negative for cough and shortness of breath  Cardiovascular: Negative for chest pain and palpitations  Gastrointestinal: Negative for abdominal pain, constipation, diarrhea, nausea and vomiting  Genitourinary: Positive for difficulty urinating, hematuria and penile pain  Negative for dysuria, flank pain, frequency, penile swelling, scrotal swelling and testicular pain  Musculoskeletal: Negative for back pain, neck pain and neck stiffness  Skin: Negative for color change, rash and wound  Allergic/Immunologic: Negative for immunocompromised state  Neurological: Negative for dizziness, syncope, weakness, light-headedness, numbness and headaches  Hematological: Negative for adenopathy  Psychiatric/Behavioral: Negative for confusion and decreased concentration  All other systems reviewed and are negative  Physical Exam  Physical Exam   Constitutional: He is oriented to person, place, and time  He appears well-developed and well-nourished  No distress  HENT:   Head: Normocephalic and atraumatic  Mouth/Throat: Oropharynx is clear and moist    Eyes: Pupils are equal, round, and reactive to light  Conjunctivae and EOM are normal    Neck: Normal range of motion  Neck supple  No JVD present  Cardiovascular: Normal rate, regular rhythm, normal heart sounds and intact distal pulses  Exam reveals no gallop and no friction rub  No murmur heard  Pulmonary/Chest: Effort normal and breath sounds normal  No respiratory distress  He has no wheezes  He has no rales  Abdominal: Soft  Bowel sounds are normal  He exhibits no distension  There is no tenderness  There is no rebound and no guarding  Genitourinary:   Genitourinary Comments: No acute abnormality of the penis or scrotum  Sparrow catheter in place  Sparrow leg bag does show dark brown-colored urine  Musculoskeletal: Normal range of motion  He exhibits no edema or tenderness  Neurological: He is alert and oriented to person, place, and time  No gross motor or sensory deficits  Skin: Skin is warm and dry  No rash noted  He is not diaphoretic  No pallor  Psychiatric: He has a normal mood and affect  His behavior is normal    Nursing note and vitals reviewed        Vital Signs  ED Triage Vitals   Temperature Pulse Respirations Blood Pressure SpO2   11/29/19 1354 11/29/19 1228 11/29/19 1354 11/29/19 1228 11/29/19 1228   98 3 °F (36 8 °C) 96 18 141/65 95 %      Temp Source Heart Rate Source Patient Position - Orthostatic VS BP Location FiO2 (%)   11/29/19 1228 11/29/19 1228 11/29/19 1228 11/29/19 1228 --   Oral Monitor Sitting Left arm       Pain Score       --                  Vitals:    11/29/19 1228   BP: 141/65   Pulse: 96 Patient Position - Orthostatic VS: Sitting         Visual Acuity      ED Medications  Medications   amoxicillin-clavulanate (AUGMENTIN) 875-125 mg per tablet 1 tablet (1 tablet Oral Given 11/29/19 1412)       Diagnostic Studies  Results Reviewed     None                 No orders to display              Procedures  Procedures       ED Course                               MDM  Number of Diagnoses or Management Options  Diagnosis management comments: 51-year-old male presents for pain related to recent Sparrow catheter placement  According to records, patient had obvious urinary tract infection and according to urine culture did grow out over >100,000cfu gamma hemolytic Streptococcus species  Will start patient on Augmentin  I recommended that we replace Sparrow catheter as he likely will still have problems with retention but patient is adamantly refusing  Advised close follow-up with Urology on Monday and discussed ED return parameters  Patient states if he does have trouble urinating again he will return  Advised him to return also if he develops fever, shaking chills, abdominal or flank pain, nausea vomiting or any other new concerning symptoms  Amount and/or Complexity of Data Reviewed  Review and summarize past medical records: yes        Disposition  Final diagnoses:   UTI (urinary tract infection)   Hematuria   Encounter for evaluation of Sparrow catheter     Time reflects when diagnosis was documented in both MDM as applicable and the Disposition within this note     Time User Action Codes Description Comment    11/29/2019  2:38 PM [place] Iron E Add [N39 0] UTI (urinary tract infection)     11/29/2019  2:38 PM [place] E Add [R31 9] Hematuria     11/29/2019  2:38 PM [place] Iron E Add [T83  9XXA] Sparrow catheter problem, initial encounter ([facility] )     11/29/2019  2:38 PM [place] E Remove Pao Seeds  9XXA] Sparrow catheter problem, initial encounter ([facility] )     11/29/2019  2:38 PM Al Purple Add [Z76 89] Encounter for evaluation of Sparrow catheter       ED Disposition     ED Disposition Condition Date/Time Comment    Discharge Stable Fri Nov 29, 2019  2:38 PM Padmaja Toure discharge to home/self care  Follow-up Information     Follow up With Specialties Details Why Contact Info Additional [address] [place] [facility] Urology Schedule an appointment as soon as possible for a visit   [address]  [address] [address]  701  [facility] Urology [facility], [address]  [address] [facility], [facility], [place], [address]    [address]  Call  To establish care with a primary care doctor if you do not already have one [address] [facility] Emergency Department Emergency Medicine Go to  If symptoms worsen [address] [facility] Christos King 1490 ED, [address] [place], [place], [address]          Patient's Medications   Discharge Prescriptions    AMOXICILLIN-CLAVULANATE (AUGMENTIN) 875-125 MG PER TABLET    Take 1 tablet by mouth every 12 (twelve) hours for 7 days       Start Date: 11/29/2019End Date: 12/6/2019       Order Dose: 1 tablet       Quantity: 14 tablet    Refills: 0     No discharge procedures on file      ED Provider  Electronically Signed by           Aron Myers DO  11/29/19 7253

## 2019-11-29 NOTE — TELEPHONE ENCOUNTER
Called number in message below ([phone]) and this was an incorrect number  Called number in chart ([phone]) and left message  In message stated that we do not have any staff in the [facility] office and that if he is experiencing pain and hematuria he should report to the ER  In message stated that we can scheduled him for an appointment in the office next week  Patient scheduled for an appointment on Monday with Nisha Edgar PA-C at the [facility] office at 1:15  When patient calls back please confirm this appointment

## 2019-11-29 NOTE — TELEPHONE ENCOUNTER
Called and spoke with patient and EC Ambreen Araujo at this time  They report the ER yesterday told them Dr Morgan Jesus would remove the catheter 'tomorrow' referencing today  The ER notes do not reflect this information  They report they were told a doctor is at the [facility] office now and they would like to come there  Informed patient there are no providers at the [facility] office today  Ambreen Araujo reports he 'needs the catheter out today, he cant sleep, it hurts him, and he's going to pull it out himself soon ' Informed patient and Ambreen Araujo there is a balloon at this tip of the catheter that holds it in place, if he pulls it out he could do a lot of damage, and cause more pain and bleeding  They report aside from the pain he is in the urine in the bag is bloody  It was difficult to get a description from them, but from their reports it seems to be dark red  Informed patient and Ambreen Araujo he could go back to ER for the blood in urine and possible catheter adjustment  They report they are going to ER for catheter removal and 'don't need the visit for Monday ' Informed them they should keep the appointment for Monday so we can evaluate him and follow up with his urinary retention  Ambreen Araujo reports she will call and cancel if necessary  Office number confirmed

## 2019-11-30 LAB — BACTERIA UR CULT: ABNORMAL

## 2019-12-03 NOTE — TELEPHONE ENCOUNTER
Yazmin Pillai please advise if you can see patient today or [place] if you can see patient tomorrow in available next day appointment at 9:15

## 2019-12-03 NOTE — TELEPHONE ENCOUNTER
Called and left message that we can see patient tomorrow in the [facility] office at 9:15 with Zilphia Riedel PA-C  Patient is to call the office back to confirm appointment

## 2019-12-03 NOTE — TELEPHONE ENCOUNTER
Patient called to advise 12/4 appointment does not work  Would like a call back to reschedule      Can be reached at [phone]

## 2019-12-03 NOTE — TELEPHONE ENCOUNTER
Dinesh Yeager is calling back to say patient's appointment was canceled yesterday and would like to be seen today  Patient is having headaches from medication that was given at the emergency room  Please advise

## 2019-12-04 NOTE — PROGRESS NOTES
Assessment and plan:       1  Urinary retention  - Patient continues to have incomplete bladder emptying with a PVR of 251 mL  - He is completely asymptomatic in regards to this  - He continues to take tamsulosin and does understand that this is important to have him emptying as best as possible  - We did discuss bladder outlet obstruction procedures which he is very against   - we discussed risks of continued incomplete bladder emptying including urinary tract infections, repeat retention, and kidney damage over a long-term period of time  - Patient refused digital rectal exam but is considering having a PSA drawn prior to his next follow-up  2  Urinary tract infection  - Patient had a positive urine culture on 11/28/2019  - He will complete antibiotic treatment tomorrow  Tiffani Hernandez PA-C      Chief Complaint     Chief Complaint   Patient presents with    Urinary Retention         History of Present Illness     Caitlin Terry is a 70 y o  male patient seen in the emergency department on 11/28/2019 for urinary retention  At that time he was additionally found to have urinary tract infection which was positive for enterococcus faecalis  Catheter was placed at that time and patient was started on tamsulosin  Patient was initiated on Augmentin when he returned the following day with complaints related to his catheter  He requested that the catheter was removed and not replaced  Patient reports that his stream is strong and he is urinating well again  He reports that everything is back to his baseline  His AUA symptom score today is 2 with an overall bother score of 0  He continues to have an adequate bladder emptying with a PVR of 251 mL  Patient is unsure if he has ever undergone prostate cancer screening  We have no previous records  He was constipated at the time of his visit to the emergency department but reports that that has been intermittently better      He refuses a digital rectal exam   He reports that he has "to take a crap " And "feels fine, so there isn't anything wrong "        Laboratory     Lab Results   Component Value Date    CREATININE 1 12 11/28/2019       No results found for: PSA    Recent Results (from the past 1 hour(s))   POCT Measure PVR    Collection Time: 12/05/19  2:43 PM   Result Value Ref Range    POST-VOID RESIDUAL VOLUME, ML  mL         Review of Systems     Review of Systems   Constitutional: Negative for chills and fever  HENT: Negative  Eyes: Negative  Respiratory: Negative for shortness of breath  Cardiovascular: Negative for chest pain  Gastrointestinal: Negative for constipation, diarrhea, nausea and vomiting  Genitourinary: Negative for difficulty urinating, dysuria, enuresis, flank pain, frequency, hematuria and urgency  Musculoskeletal: Negative  AUA SYMPTOM SCORE      Most Recent Value   AUA SYMPTOM SCORE   How often have you had a sensation of not emptying your bladder completely after you finished urinating? 0   How often have you had to urinate again less than two hours after you finished urinating? 0   How often have you found you stopped and started again several times when you urinate?  0   How often have you found it difficult to postpone urination? 0   How often have you had a weak urinary stream?  0   How often have you had to push or strain to begin urination? 0   How many times did you most typically get up to urinate from the time you went to bed at night until the time you got up in the morning? 2   Quality of Life: If you were to spend the rest of your life with your urinary condition just the way it is now, how would you feel about that?  0   AUA SYMPTOM SCORE  2                  Allergies     No Known Allergies    Physical Exam     Physical Exam   Constitutional: He is oriented to person, place, and time  He appears well-developed and well-nourished  No distress     HENT:   Head: Normocephalic and atraumatic  Right Ear: External ear normal    Left Ear: External ear normal    Nose: Nose normal    Eyes: Right eye exhibits no discharge  Left eye exhibits no discharge  No scleral icterus  Cardiovascular: Normal rate  Pulmonary/Chest: Effort normal    Genitourinary:   Genitourinary Comments: Patient refused   Musculoskeletal:   Ambulates independently   Neurological: He is alert and oriented to person, place, and time  Skin: Skin is warm and dry  He is not diaphoretic  Psychiatric: He has a normal mood and affect  His behavior is normal  Judgment and thought content normal    Nursing note and vitals reviewed  Vital Signs     Vitals:    12/05/19 1441   BP: 150/68   Pulse: 86   Weight: 84 4 kg (186 lb)   Height: 6' 2" (1 88 m)         Current Medications       Current Outpatient Medications:     amoxicillin-clavulanate (AUGMENTIN) 875-125 mg per tablet, Take 1 tablet by mouth every 12 (twelve) hours for 7 days, Disp: 14 tablet, Rfl: 0    tamsulosin (FLOMAX) 0 4 mg, Take 1 capsule (0 4 mg total) by mouth daily with dinner, Disp: 90 capsule, Rfl: 3    lisinopril (ZESTRIL) 20 mg tablet, Take 1 tablet (20 mg total) by mouth daily for 90 days, Disp: 90 tablet, Rfl: 1      Active Problems     Patient Active Problem List   Diagnosis    Encounter to establish care    Essential hypertension    Seasonal allergic rhinitis due to pollen    Hyperlipidemia, mixed    Acute non-recurrent ethmoidal sinusitis         Past Medical History     Past Medical History:   Diagnosis Date    Hypertension          Surgical History     Past Surgical History:   Procedure Laterality Date    FRACTURE SURGERY      HERNIA REPAIR           Family History     History reviewed  No pertinent family history        Social History     Social History       Radiology

## 2019-12-04 NOTE — TELEPHONE ENCOUNTER
Called and spoke to [facility]  Patient scheduled for an appointment tomorrow at 3:00 at the [facility] office due to not being able to make appointment today and Monday appointment was canceled  Merissa Bellamy was given directions to the office

## 2019-12-05 ENCOUNTER — OFFICE VISIT (OUTPATIENT)
Dept: UROLOGY | Facility: CLINIC | Age: 79
End: 2019-12-05
Payer: COMMERCIAL

## 2019-12-05 VITALS
WEIGHT: 186 LBS | DIASTOLIC BLOOD PRESSURE: 68 MMHG | HEART RATE: 86 BPM | BODY MASS INDEX: 23.87 KG/M2 | SYSTOLIC BLOOD PRESSURE: 150 MMHG | HEIGHT: 74 IN

## 2019-12-05 DIAGNOSIS — R33.9 URINARY RETENTION: ICD-10-CM

## 2019-12-05 DIAGNOSIS — R33.9 URINARY RETENTION: Primary | ICD-10-CM

## 2019-12-05 LAB — POST-VOID RESIDUAL VOLUME, ML POC: 251 ML

## 2019-12-05 PROCEDURE — 99203 OFFICE O/P NEW LOW 30 MIN: CPT | Performed by: PHYSICIAN ASSISTANT

## 2019-12-05 PROCEDURE — 51798 US URINE CAPACITY MEASURE: CPT | Performed by: PHYSICIAN ASSISTANT

## 2019-12-05 RX ORDER — TAMSULOSIN HYDROCHLORIDE 0.4 MG/1
0.4 CAPSULE ORAL
Qty: 90 CAPSULE | Refills: 3 | Status: SHIPPED | OUTPATIENT
Start: 2019-12-05 | End: 2019-12-10 | Stop reason: SINTOL

## 2019-12-05 RX ORDER — TAMSULOSIN HYDROCHLORIDE 0.4 MG/1
0.4 CAPSULE ORAL
Qty: 90 CAPSULE | Refills: 3 | Status: SHIPPED | OUTPATIENT
Start: 2019-12-05 | End: 2019-12-05 | Stop reason: SDUPTHER

## 2019-12-10 ENCOUNTER — TELEPHONE (OUTPATIENT)
Dept: UROLOGY | Facility: MEDICAL CENTER | Age: 79
End: 2019-12-10

## 2019-12-10 DIAGNOSIS — R33.9 URINARY RETENTION: Primary | ICD-10-CM

## 2019-12-10 RX ORDER — ALFUZOSIN HYDROCHLORIDE 10 MG/1
10 TABLET, EXTENDED RELEASE ORAL DAILY
Qty: 90 TABLET | Refills: 3 | Status: SHIPPED | OUTPATIENT
Start: 2019-12-10 | End: 2019-12-12 | Stop reason: SINTOL

## 2019-12-10 NOTE — TELEPHONE ENCOUNTER
Those are not typical side effects of this medication  I have sent a prescription of alfuzosin to the patient's preferred pharmacy in its place  Side effect profile is the same

## 2019-12-10 NOTE — TELEPHONE ENCOUNTER
Patient seen by Bren De Leon in [place]  Patient was given Tamsulosin and he is having problems sleeping and its draining his energy  Please call patient at [phone]

## 2019-12-10 NOTE — TELEPHONE ENCOUNTER
Called and spoke to patient  Explained to patient that Those are not typical side effects of this medication  I have sent a prescription of alfuzosin to the patient's preferred pharmacy in its place  Side effect profile is the same    Patient understood and will  the prescription tomorrow

## 2019-12-10 NOTE — TELEPHONE ENCOUNTER
Patient seen by Magdi Deshpande PA-C at the [facility] office on 12/5/19  Patient with recent episode of urinary retention, gerber catheter placed in the ER on 11/29/19  Patient had void trial, PVR at time of appointment on 12/5/19 was 251 ml  Patient asymptomatic at that time  Patient was instructed to continue with Flomax  Patient currently with pending 3 month follow up on 3/5/19  Patient having problems with sleeping and lack of energy since taking Flomax  Please advise if alternative available for patient

## 2019-12-12 DIAGNOSIS — R33.9 URINARY RETENTION: Primary | ICD-10-CM

## 2019-12-12 RX ORDER — DOXAZOSIN MESYLATE 4 MG/1
4 TABLET ORAL
Qty: 30 TABLET | Refills: 3 | Status: SHIPPED | OUTPATIENT
Start: 2019-12-12

## 2019-12-12 NOTE — TELEPHONE ENCOUNTER
I have sent a prescription of doxazosin to the patient preferred pharmacy  This in the same class as the other 2 medications and may cause the same side effects  If he continues to not tolerate any of these medications, he may need to undergo workup for bladder outlet obstruction procedure

## 2019-12-12 NOTE — TELEPHONE ENCOUNTER
Called and left detailed message per communication consent  Informed patient of doxazosin called into preferred pharmacy  Informed patient this is in the same class at the other medications and may cause the same side effects  If patient cannot tolerate the medications we would recommend workup for bladder outlet obstruction surgery  Encouraged a call back with any questions or concerns

## 2019-12-12 NOTE — TELEPHONE ENCOUNTER
Patient calling to advise alfuzosin (UROXATRAL) 10 mg 24 hr tablet  Is too expensive      Asking for a call back at [phone]

## 2024-02-21 PROBLEM — J01.20 ACUTE NON-RECURRENT ETHMOIDAL SINUSITIS: Status: RESOLVED | Noted: 2018-06-04 | Resolved: 2024-02-21

## 2024-07-09 ENCOUNTER — PREP FOR PROCEDURE (OUTPATIENT)
Dept: GASTROENTEROLOGY | Facility: CLINIC | Age: 84
End: 2024-07-09

## 2024-07-09 ENCOUNTER — TELEPHONE (OUTPATIENT)
Dept: GASTROENTEROLOGY | Facility: CLINIC | Age: 84
End: 2024-07-09

## 2024-07-09 ENCOUNTER — OFFICE VISIT (OUTPATIENT)
Dept: GASTROENTEROLOGY | Facility: CLINIC | Age: 84
End: 2024-07-09
Payer: MEDICARE

## 2024-07-09 VITALS
HEART RATE: 83 BPM | WEIGHT: 182 LBS | BODY MASS INDEX: 22.63 KG/M2 | SYSTOLIC BLOOD PRESSURE: 128 MMHG | HEIGHT: 75 IN | DIASTOLIC BLOOD PRESSURE: 76 MMHG

## 2024-07-09 DIAGNOSIS — R19.5 POSITIVE COLORECTAL CANCER SCREENING USING COLOGUARD TEST: ICD-10-CM

## 2024-07-09 DIAGNOSIS — K59.00 CONSTIPATION, UNSPECIFIED CONSTIPATION TYPE: Primary | ICD-10-CM

## 2024-07-09 DIAGNOSIS — Z86.010 HX OF COLONIC POLYP: ICD-10-CM

## 2024-07-09 PROCEDURE — 99204 OFFICE O/P NEW MOD 45 MIN: CPT | Performed by: PHYSICIAN ASSISTANT

## 2024-07-09 RX ORDER — IPRATROPIUM BROMIDE 42 UG/1
SPRAY, METERED NASAL
COMMUNITY
Start: 2024-06-21

## 2024-07-09 RX ORDER — MONTELUKAST SODIUM 10 MG/1
10 TABLET ORAL DAILY PRN
COMMUNITY
Start: 2024-05-25

## 2024-07-09 NOTE — TELEPHONE ENCOUNTER
Scheduled date of colonoscopy (as of today):8/8/24  Physician performing colonoscopy:Dr Raman   Location of colonoscopy:   Bowel prep reviewed with patient:mary  Instructions reviewed with patient by:sb  Clearances: none

## 2024-10-07 ENCOUNTER — TELEPHONE (OUTPATIENT)
Dept: GASTROENTEROLOGY | Facility: CLINIC | Age: 84
End: 2024-10-07

## 2024-10-07 NOTE — TELEPHONE ENCOUNTER
----- Message from Imelda FERNANDEZ sent at 10/7/2024  1:31 PM EDT -----  Regarding: reschedule colonoscopy  Please reach out to patient to reschedule colonoscopy with Dr. Raman    Thank you!